# Patient Record
(demographics unavailable — no encounter records)

---

## 2024-11-20 NOTE — PLAN
[FreeTextEntry1] : She was advised that the ear pain may be due to a nerve impingement from her neck.  For the acid reflux she was advised to eat slowly and to continue to follow a low acid diet and to take famotidine 20 mg bid for 10 days then prn. She will follow up on 11/25 for her well visit or sooner prn.

## 2024-11-20 NOTE — HISTORY OF PRESENT ILLNESS
[FreeTextEntry8] : pt presents with on and off pain on the outer right ear starting about a month ago, and acid reflux.  She has a "nerve" pain in the right outer ear on and off.  She also has belching and heartburn.  She is taking Miralax for constipation.  She took Pepcid on 1 occasion for the reflux.

## 2024-11-20 NOTE — PHYSICAL EXAM
[No Acute Distress] : no acute distress [Well Nourished] : well nourished [Well Developed] : well developed [Well-Appearing] : well-appearing [Normal Voice/Communication] : normal voice/communication [Normal Sclera/Conjunctiva] : normal sclera/conjunctiva [Normal Outer Ear/Nose] : the outer ears and nose were normal in appearance [Normal TMs] : both tympanic membranes were normal [No Respiratory Distress] : no respiratory distress  [No Accessory Muscle Use] : no accessory muscle use [Clear to Auscultation] : lungs were clear to auscultation bilaterally [Normal Rate] : normal rate  [Regular Rhythm] : with a regular rhythm [Normal S1, S2] : normal S1 and S2 [Soft] : abdomen soft [Non Tender] : non-tender [Non-distended] : non-distended [Normal Bowel Sounds] : normal bowel sounds [Normal Mood] : the mood was normal

## 2024-11-20 NOTE — REVIEW OF SYSTEMS
[Earache] : earache [Abdominal Pain] : no abdominal pain [Nausea] : no nausea [Constipation] : constipation [Diarrhea] : diarrhea [Vomiting] : no vomiting [Heartburn] : heartburn [Headache] : headache [Negative] : Heme/Lymph [FreeTextEntry9] : neck pain

## 2025-02-06 NOTE — HISTORY OF PRESENT ILLNESS
[FreeTextEntry1] : Pt notes that she continues off of samples of Ajovy.  has maintained 6-9 events/ month.  Has had samples of Nurtec or Ubrelvy with moderate effect.  Sulindac seems to work best of her anti-inflammatories.   Has had some reflux and hair loss.   Finds rizatriptan still give her best acute benefit.  Can get some intermittent pain around

## 2025-03-07 NOTE — DISCUSSION/SUMMARY
[de-identified] : (Impression) -right knee pre-patellar bursitis, patellar tendonitis, mild patellofemoral osteoarthritis    The diagnosis was explained in detail. The potential non-surgical and surgical treatments were reviewed. The relative risks and benefits of each option were considered relative to the patient age, activity level, medical history, symptom severity and previously attempted treatments.  The patient was advised to consult with their primary medical provider prior to initiation of any new medications to reduce the risk of adverse effects specific to their long-term home medications and medical history. The risk of gastrointestinal irritation and kidney injury specific to long-term NSAID use was discussed.    (Plan)  -Physical therapy recommended for stretching and strengthening. -Cortisone injection is deferred at this time. -Over the counter NSAID for pain and inflammation, take as needed. -Topical voltaren gel as needed for pain. -MRI is deferred at this time. -Follow up in 6 to 8 weeks. If symptoms persist consider MRI.     (MDM) Problem Complexity -Moderate: chronic illness with exacerbation   Risk -Low: over the counter medication   Visit Type -Established

## 2025-03-07 NOTE — HISTORY OF PRESENT ILLNESS
[de-identified] : 03/07/2025: f/u for right knee, has not been seen since 02/13/2023. pain resolved after the visit. pain returned about 2 months ago, no injury. reports pain only with kneeling, localized anteriorly. denies swelling. not taking anything for pain. did not go to PT since last visit.   (New Visit) Patient Details -Age: 72 -Occupation: retired  -Worker's compensation claim: no -No-fault claim: no -School injury claim: n Symptom Details  -Body part: RT knee  -Duration of symptoms: 02/10/23 woke up with pain -How symptoms began: NKI. patient reports twisting injury to the knee 2 weeks prior but did not have pain right away. denies mechanical symptoms -Quality of pain: Sharp -Pain score at rest: 3/10 -Pain score with activity: 8/10 -Swelling: mild  -Stiffness:yes  -Radicular symptoms (numbness or radiating pain down extremity): no   Treatment Details -Activity modification: yes  -Medication: advil prn -Physical therapy: no -Home exercise program: no -Injection: no -MRI: no

## 2025-03-07 NOTE — IMAGING
[Right] : right knee [All Views] : anteroposterior, lateral, skyline, and anteroposterior standing [Mild patellofemoral OA] : Mild patellofemoral OA [de-identified] : (Exam: Knee)   Laterality is right    Patient is in no acute distress, alert and oriented Sensation is grossly intact to light touch in the foot Motor function is 5/5 in the foot Capillary refill is less than 2 seconds in the toes Lymphadenopathy is not present Peripheral edema is not present   Skin is intact Effusion is trace Atrophy is not present Antalgic gait is not present   Medial joint line tenderness is not present Lateral joint line tenderness is not present Patellar tenderness is present Calf tenderness is not present   Knee extension is 0 Knee flexion is 135   Quadriceps strength is 5/5 Hamstring strength is 5/5   Lachman is normal Posterior drawer is normal Varus stress stability is normal Valgus stress stability is normal   Medial Addis test is normal Lateral Addis test is normal Patellofemoral grind test is abnormal Patellar apprehension test is normal [FreeTextEntry9] : no interval change

## 2025-05-06 NOTE — ASSESSMENT
[FreeTextEntry1] : EXAM Left index finger with mild swelling at DIPj, ski intact. +ttp at DIPj. Able to flex and extend at MCP, PIP and DIP with no rotational deformity. Sensation intact at radial and ulnar pulp. <2sec cap refill.  Left index finger radiographs with no overt fracture nor dislocation. (3-view as viewed by me from outside facility)  ASSESSMENT/PLAN Left index finger DIPj sprain - reviewed radiographs and pathoanatomy with patient. Will manage in coban sukhdev tape, ROM permitted. Elevate, minimize use. OT prn.  F/u 4week; reassess

## 2025-05-06 NOTE — HISTORY OF PRESENT ILLNESS
[de-identified] : Age: 74 year F PMHx: HLD, hypothyroidism Hand Dominance: RHD Chief Complaint: Left index finger pain s/p trauma 05/01/25. Patient reports that on the morning of 05/01/25, stating that she was walking a large dog when her left index finger got caught in the leash, causing her injury. Patient was seen at [facility] 05/02/25 where she had radiographs performed. Patient also reports pain in the base of her left thumb, but states it was present prior to injury, likely attributes it to arthritis. Denies numbness/tingling. Patient presents with an alumafoam finger splint.  Trauma: 05/01/25 Outside Imaging/Treatment: radiographs from [facility] 05/02/25 OTC Medications: none OT/PT: none Bracing: finger in splint Pain worse with: movement Pain better with: rest

## 2025-05-06 NOTE — HISTORY OF PRESENT ILLNESS
[de-identified] : Age: 74 year F PMHx: HLD, hypothyroidism Hand Dominance: RHD Chief Complaint: Left index finger pain s/p trauma 05/01/25. Patient reports that on the morning of 05/01/25, stating that she was walking a large dog when her left index finger got caught in the leash, causing her injury. Patient was seen at [facility] 05/02/25 where she had radiographs performed. Patient also reports pain in the base of her left thumb, but states it was present prior to injury, likely attributes it to arthritis. Denies numbness/tingling. Patient presents with an alumafoam finger splint.  Trauma: 05/01/25 Outside Imaging/Treatment: radiographs from [facility] 05/02/25 OTC Medications: none OT/PT: none Bracing: finger in splint Pain worse with: movement Pain better with: rest

## 2025-06-24 NOTE — HISTORY OF PRESENT ILLNESS
[de-identified] : Age: 74 year F PMHx: HLD, hypothyroidism Hand Dominance: RHD Chief Complaint: Left index finger pain s/p trauma 05/01/25. Patient reports that on the morning of 05/01/25, stating that she was walking a large dog when her left index finger got caught in the leash, causing her injury. Patient was seen at [facility] 05/02/25 where she had radiographs performed. Patient also reports pain in the base of her left thumb, but states it was present prior to injury, likely attributes it to arthritis. Denies numbness/tingling. Patient presents with an alumafoam finger splint.  Trauma: 05/01/25 Outside Imaging/Treatment: radiographs from [facility] 05/02/25 OTC Medications: none OT/PT: none Bracing: finger in splint Pain worse with: movement Pain better with: rest  06/23/25: f/u left index finger. Patient reports that she is still having pain throughout her left index finger, but not as severe as her previous visit. Patient notes that she has noticed more stiffness throughout her finger and is having pain and discomfort, especially at night. Patient notes pain on the knuckle of the left index finger that is worse with palpation.

## 2025-06-24 NOTE — HISTORY OF PRESENT ILLNESS
[de-identified] : Age: 74 year F PMHx: HLD, hypothyroidism Hand Dominance: RHD Chief Complaint: Left index finger pain s/p trauma 05/01/25. Patient reports that on the morning of 05/01/25, stating that she was walking a large dog when her left index finger got caught in the leash, causing her injury. Patient was seen at [facility] 05/02/25 where she had radiographs performed. Patient also reports pain in the base of her left thumb, but states it was present prior to injury, likely attributes it to arthritis. Denies numbness/tingling. Patient presents with an alumafoam finger splint.  Trauma: 05/01/25 Outside Imaging/Treatment: radiographs from [facility] 05/02/25 OTC Medications: none OT/PT: none Bracing: finger in splint Pain worse with: movement Pain better with: rest  06/23/25: f/u left index finger. Patient reports that she is still having pain throughout her left index finger, but not as severe as her previous visit. Patient notes that she has noticed more stiffness throughout her finger and is having pain and discomfort, especially at night. Patient notes pain on the knuckle of the left index finger that is worse with palpation.

## 2025-06-24 NOTE — ASSESSMENT
[FreeTextEntry1] : EXAM Left index finger with mild swelling at ulnar PIPj, ski intact. +ttp at PIPj. Able to flex and extend at MCP, PIP and DIP with no rotational deformity. Sensation intact at radial and ulnar pulp. <2sec cap refill.  Left index finger radiographs with no overt fracture nor dislocation. (3-view as viewed by me from outside facility)  ASSESSMENT/PLAN Left index finger PIPj sprain - reviewed radiographs and pathoanatomy with patient. Will manage in coban sukhdev tape, ROM permitted. Elevate, minimize use. OT prn.  F/u 4week; reassess